# Patient Record
Sex: OTHER/UNKNOWN | URBAN - METROPOLITAN AREA
[De-identification: names, ages, dates, MRNs, and addresses within clinical notes are randomized per-mention and may not be internally consistent; named-entity substitution may affect disease eponyms.]

---

## 2019-12-05 ENCOUNTER — APPOINTMENT (OUTPATIENT)
Age: 59
Setting detail: DERMATOLOGY
End: 2019-12-11

## 2019-12-05 VITALS
HEART RATE: 91 BPM | WEIGHT: 245 LBS | HEIGHT: 71 IN | DIASTOLIC BLOOD PRESSURE: 73 MMHG | SYSTOLIC BLOOD PRESSURE: 122 MMHG

## 2019-12-05 DIAGNOSIS — L40.0 PSORIASIS VULGARIS: ICD-10-CM

## 2019-12-05 PROBLEM — K75.9 INFLAMMATORY LIVER DISEASE, UNSPECIFIED: Status: ACTIVE | Noted: 2019-12-05

## 2019-12-05 PROCEDURE — 99203 OFFICE O/P NEW LOW 30 MIN: CPT

## 2019-12-05 PROCEDURE — OTHER COUNSELING: OTHER

## 2019-12-05 PROCEDURE — OTHER OTEZLA INITIATION: OTHER

## 2019-12-05 PROCEDURE — OTHER TREATMENT REGIMEN: OTHER

## 2019-12-05 PROCEDURE — OTHER MIPS QUALITY: OTHER

## 2019-12-05 PROCEDURE — OTHER PRESCRIPTION: OTHER

## 2019-12-05 PROCEDURE — OTHER MEDICATION COUNSELING: OTHER

## 2019-12-05 RX ORDER — CALCIPOTRIENE AND BETAMETHASONE DIPROPIONATE 50; .5 UG/G; MG/G
AEROSOL, FOAM TOPICAL QD
Qty: 1 | Refills: 4 | Status: ERX | COMMUNITY
Start: 2019-12-05

## 2019-12-05 RX ORDER — TRIAMCINOLONE ACETONIDE 1 MG/G
CREAM TOPICAL
Qty: 1 | Refills: 0 | Status: ERX | COMMUNITY
Start: 2019-12-05

## 2019-12-05 ASSESSMENT — LOCATION SIMPLE DESCRIPTION DERM
LOCATION SIMPLE: LEFT FOREHEAD
LOCATION SIMPLE: LEFT SHOULDER
LOCATION SIMPLE: LEFT POSTERIOR THIGH
LOCATION SIMPLE: RIGHT UPPER BACK
LOCATION SIMPLE: LEFT KNEE
LOCATION SIMPLE: RIGHT CHEEK
LOCATION SIMPLE: LEFT POPLITEAL SKIN
LOCATION SIMPLE: LEFT BUTTOCK
LOCATION SIMPLE: RIGHT BUTTOCK
LOCATION SIMPLE: RIGHT SCALP
LOCATION SIMPLE: LEFT LOWER BACK
LOCATION SIMPLE: LEFT UPPER ARM
LOCATION SIMPLE: UPPER LIP
LOCATION SIMPLE: POSTERIOR NECK
LOCATION SIMPLE: RIGHT SHOULDER
LOCATION SIMPLE: RIGHT UPPER ARM
LOCATION SIMPLE: RIGHT FOREHEAD
LOCATION SIMPLE: RIGHT PRETIBIAL REGION
LOCATION SIMPLE: RIGHT POSTERIOR THIGH
LOCATION SIMPLE: RIGHT CLAVICULAR SKIN
LOCATION SIMPLE: LEFT ELBOW
LOCATION SIMPLE: LEFT CLAVICULAR SKIN
LOCATION SIMPLE: RIGHT ANTERIOR NECK
LOCATION SIMPLE: LEFT THIGH
LOCATION SIMPLE: LEFT LIP
LOCATION SIMPLE: LEFT PRETIBIAL REGION
LOCATION SIMPLE: RIGHT LOWER BACK
LOCATION SIMPLE: LEFT ANTERIOR NECK
LOCATION SIMPLE: ABDOMEN
LOCATION SIMPLE: RIGHT POPLITEAL SKIN
LOCATION SIMPLE: LEFT CALF
LOCATION SIMPLE: SCALP
LOCATION SIMPLE: RIGHT FOREARM
LOCATION SIMPLE: RIGHT ELBOW
LOCATION SIMPLE: RIGHT KNEE
LOCATION SIMPLE: RIGHT CALF
LOCATION SIMPLE: RIGHT THIGH

## 2019-12-05 ASSESSMENT — LOCATION DETAILED DESCRIPTION DERM
LOCATION DETAILED: PHILTRUM
LOCATION DETAILED: RIGHT CLAVICULAR NECK
LOCATION DETAILED: RIGHT SUPERIOR FOREHEAD
LOCATION DETAILED: LEFT LATERAL BUTTOCK
LOCATION DETAILED: PERIUMBILICAL SKIN
LOCATION DETAILED: RIGHT INFERIOR ANTERIOR NECK
LOCATION DETAILED: RIGHT PROXIMAL POSTERIOR THIGH
LOCATION DETAILED: LEFT MEDIAL TRAPEZIAL NECK
LOCATION DETAILED: RIGHT DISTAL CALF
LOCATION DETAILED: LEFT CLAVICULAR NECK
LOCATION DETAILED: RIGHT LATERAL FOREHEAD
LOCATION DETAILED: RIGHT MEDIAL FRONTAL SCALP
LOCATION DETAILED: LEFT PROXIMAL PRETIBIAL REGION
LOCATION DETAILED: LEFT DISTAL MEDIAL CALF
LOCATION DETAILED: LEFT INFERIOR LATERAL FOREHEAD
LOCATION DETAILED: RIGHT INFERIOR CENTRAL MALAR CHEEK
LOCATION DETAILED: LEFT ANTECUBITAL SKIN
LOCATION DETAILED: RIGHT SUPERIOR UPPER BACK
LOCATION DETAILED: LEFT ELBOW
LOCATION DETAILED: LEFT ANTERIOR PROXIMAL UPPER ARM
LOCATION DETAILED: RIGHT SUPERIOR PARIETAL SCALP
LOCATION DETAILED: RIGHT ELBOW
LOCATION DETAILED: RIGHT VENTRAL PROXIMAL FOREARM
LOCATION DETAILED: LEFT CLAVICULAR SKIN
LOCATION DETAILED: RIGHT DISTAL PRETIBIAL REGION
LOCATION DETAILED: LEFT POPLITEAL SKIN
LOCATION DETAILED: RIGHT POPLITEAL SKIN
LOCATION DETAILED: LEFT ANTERIOR SHOULDER
LOCATION DETAILED: LEFT SUPERIOR LATERAL MIDBACK
LOCATION DETAILED: LEFT DISTAL CALF
LOCATION DETAILED: RIGHT POSTERIOR SHOULDER
LOCATION DETAILED: RIGHT BUTTOCK
LOCATION DETAILED: RIGHT ANTERIOR DISTAL UPPER ARM
LOCATION DETAILED: RIGHT ANTERIOR SHOULDER
LOCATION DETAILED: LEFT POSTERIOR SHOULDER
LOCATION DETAILED: LEFT ANTERIOR PROXIMAL THIGH
LOCATION DETAILED: RIGHT KNEE
LOCATION DETAILED: LEFT PROXIMAL POSTERIOR THIGH
LOCATION DETAILED: RIGHT RIB CAGE
LOCATION DETAILED: RIGHT INFERIOR MEDIAL MIDBACK
LOCATION DETAILED: LEFT SUPERIOR FOREHEAD
LOCATION DETAILED: LEFT SUPERIOR PARIETAL SCALP
LOCATION DETAILED: RIGHT ANTERIOR PROXIMAL THIGH
LOCATION DETAILED: LEFT KNEE
LOCATION DETAILED: RIGHT CLAVICULAR SKIN
LOCATION DETAILED: LEFT LOWER CUTANEOUS LIP

## 2019-12-05 ASSESSMENT — LOCATION ZONE DERM
LOCATION ZONE: LEG
LOCATION ZONE: TRUNK
LOCATION ZONE: FACE
LOCATION ZONE: SCALP
LOCATION ZONE: LIP
LOCATION ZONE: ARM
LOCATION ZONE: NECK

## 2019-12-05 ASSESSMENT — BSA PSORIASIS: % BODY COVERED IN PSORIASIS: 70

## 2019-12-05 NOTE — PROCEDURE: MIPS QUALITY
Quality 110: Preventive Care And Screening: Influenza Immunization: Influenza Immunization not Administered because Patient Refused.
Detail Level: Detailed
Quality 226: Preventive Care And Screening: Tobacco Use: Screening And Cessation Intervention: Patient screened for tobacco use, is a smoker AND did not received Cessation Counseling for Medical Reasons

## 2019-12-05 NOTE — PROCEDURE: TREATMENT REGIMEN
Action 4: Continue
Discontinue Regimen: Griseofolvin
Start Regimen: Otezla starter pack-Follow directions as prescribed.
Detail Level: Zone

## 2019-12-09 ENCOUNTER — RX ONLY (RX ONLY)
Age: 59
End: 2019-12-09

## 2019-12-09 RX ORDER — CLOBETASOL PROPIONATE 0.5 MG/ML
PEA SIZE SOLUTION TOPICAL BID
Qty: 1 | Refills: 4 | Status: ERX | COMMUNITY
Start: 2019-12-09

## 2020-01-03 ENCOUNTER — RX ONLY (RX ONLY)
Age: 60
End: 2020-01-03

## 2020-01-03 ENCOUNTER — APPOINTMENT (OUTPATIENT)
Age: 60
Setting detail: DERMATOLOGY
End: 2020-01-08

## 2020-01-03 VITALS
HEIGHT: 71 IN | WEIGHT: 245 LBS | SYSTOLIC BLOOD PRESSURE: 91 MMHG | DIASTOLIC BLOOD PRESSURE: 56 MMHG | HEART RATE: 112 BPM

## 2020-01-03 DIAGNOSIS — L40.0 PSORIASIS VULGARIS: ICD-10-CM

## 2020-01-03 PROCEDURE — OTHER MIPS QUALITY: OTHER

## 2020-01-03 PROCEDURE — OTHER TREATMENT REGIMEN: OTHER

## 2020-01-03 PROCEDURE — OTHER COUNSELING: OTHER

## 2020-01-03 PROCEDURE — 99214 OFFICE O/P EST MOD 30 MIN: CPT

## 2020-01-03 PROCEDURE — OTHER MEDICATION COUNSELING: OTHER

## 2020-01-03 RX ORDER — CLOBETASOL PROPIONATE 0.5 MG/ML
PEA SIZE SOLUTION TOPICAL BID
Qty: 1 | Refills: 5 | Status: ERX | COMMUNITY
Start: 2020-01-03

## 2020-01-03 RX ORDER — TRIAMCINOLONE ACETONIDE 1 MG/G
CREAM TOPICAL
Qty: 1 | Refills: 5 | Status: ERX

## 2020-01-03 RX ORDER — CALCIPOTRIENE AND BETAMETHASONE DIPROPIONATE 50; .5 UG/G; MG/G
AEROSOL, FOAM TOPICAL QD
Qty: 1 | Refills: 4 | Status: ACTIVE

## 2020-01-03 ASSESSMENT — LOCATION DETAILED DESCRIPTION DERM
LOCATION DETAILED: LEFT POPLITEAL SKIN
LOCATION DETAILED: LEFT DISTAL MEDIAL CALF
LOCATION DETAILED: RIGHT SUPERIOR FOREHEAD
LOCATION DETAILED: RIGHT SUPERIOR UPPER BACK
LOCATION DETAILED: RIGHT INFERIOR ANTERIOR NECK
LOCATION DETAILED: LEFT SUPERIOR FOREHEAD
LOCATION DETAILED: LEFT ANTERIOR PROXIMAL THIGH
LOCATION DETAILED: RIGHT ANTERIOR PROXIMAL THIGH
LOCATION DETAILED: RIGHT CLAVICULAR SKIN
LOCATION DETAILED: RIGHT INFERIOR MEDIAL MIDBACK
LOCATION DETAILED: LEFT POSTERIOR SHOULDER
LOCATION DETAILED: RIGHT ANTERIOR SHOULDER
LOCATION DETAILED: PHILTRUM
LOCATION DETAILED: LEFT ANTECUBITAL SKIN
LOCATION DETAILED: LEFT CLAVICULAR SKIN
LOCATION DETAILED: LEFT ELBOW
LOCATION DETAILED: LEFT CLAVICULAR NECK
LOCATION DETAILED: LEFT INFERIOR LATERAL FOREHEAD
LOCATION DETAILED: LEFT LATERAL BUTTOCK
LOCATION DETAILED: LEFT LOWER CUTANEOUS LIP
LOCATION DETAILED: RIGHT SUPERIOR PARIETAL SCALP
LOCATION DETAILED: RIGHT LATERAL FOREHEAD
LOCATION DETAILED: RIGHT POPLITEAL SKIN
LOCATION DETAILED: RIGHT POSTERIOR SHOULDER
LOCATION DETAILED: LEFT MEDIAL TRAPEZIAL NECK
LOCATION DETAILED: RIGHT VENTRAL PROXIMAL FOREARM
LOCATION DETAILED: LEFT ANTERIOR SHOULDER
LOCATION DETAILED: RIGHT ELBOW
LOCATION DETAILED: LEFT SUPERIOR LATERAL MIDBACK
LOCATION DETAILED: PERIUMBILICAL SKIN
LOCATION DETAILED: RIGHT INFERIOR CENTRAL MALAR CHEEK
LOCATION DETAILED: RIGHT CLAVICULAR NECK
LOCATION DETAILED: LEFT KNEE
LOCATION DETAILED: RIGHT KNEE
LOCATION DETAILED: LEFT ANTERIOR PROXIMAL UPPER ARM
LOCATION DETAILED: LEFT SUPERIOR PARIETAL SCALP
LOCATION DETAILED: RIGHT ANTERIOR DISTAL UPPER ARM
LOCATION DETAILED: RIGHT BUTTOCK
LOCATION DETAILED: LEFT PROXIMAL POSTERIOR THIGH
LOCATION DETAILED: RIGHT DISTAL CALF
LOCATION DETAILED: RIGHT DISTAL PRETIBIAL REGION
LOCATION DETAILED: RIGHT PROXIMAL POSTERIOR THIGH
LOCATION DETAILED: RIGHT MEDIAL FRONTAL SCALP
LOCATION DETAILED: RIGHT RIB CAGE
LOCATION DETAILED: LEFT PROXIMAL PRETIBIAL REGION

## 2020-01-03 ASSESSMENT — LOCATION SIMPLE DESCRIPTION DERM
LOCATION SIMPLE: LEFT UPPER ARM
LOCATION SIMPLE: RIGHT PRETIBIAL REGION
LOCATION SIMPLE: RIGHT UPPER BACK
LOCATION SIMPLE: RIGHT THIGH
LOCATION SIMPLE: RIGHT CALF
LOCATION SIMPLE: RIGHT KNEE
LOCATION SIMPLE: UPPER LIP
LOCATION SIMPLE: RIGHT POSTERIOR THIGH
LOCATION SIMPLE: RIGHT FOREHEAD
LOCATION SIMPLE: RIGHT CLAVICULAR SKIN
LOCATION SIMPLE: LEFT FOREHEAD
LOCATION SIMPLE: RIGHT POPLITEAL SKIN
LOCATION SIMPLE: LEFT SHOULDER
LOCATION SIMPLE: RIGHT SCALP
LOCATION SIMPLE: RIGHT LOWER BACK
LOCATION SIMPLE: RIGHT FOREARM
LOCATION SIMPLE: LEFT POPLITEAL SKIN
LOCATION SIMPLE: LEFT PRETIBIAL REGION
LOCATION SIMPLE: LEFT THIGH
LOCATION SIMPLE: RIGHT SHOULDER
LOCATION SIMPLE: LEFT CALF
LOCATION SIMPLE: RIGHT CHEEK
LOCATION SIMPLE: LEFT LOWER BACK
LOCATION SIMPLE: POSTERIOR NECK
LOCATION SIMPLE: ABDOMEN
LOCATION SIMPLE: LEFT KNEE
LOCATION SIMPLE: LEFT POSTERIOR THIGH
LOCATION SIMPLE: RIGHT UPPER ARM
LOCATION SIMPLE: LEFT LIP
LOCATION SIMPLE: RIGHT ELBOW
LOCATION SIMPLE: SCALP
LOCATION SIMPLE: RIGHT ANTERIOR NECK
LOCATION SIMPLE: RIGHT BUTTOCK
LOCATION SIMPLE: LEFT ANTERIOR NECK
LOCATION SIMPLE: LEFT ELBOW
LOCATION SIMPLE: LEFT CLAVICULAR SKIN
LOCATION SIMPLE: LEFT BUTTOCK

## 2020-01-03 ASSESSMENT — LOCATION ZONE DERM
LOCATION ZONE: NECK
LOCATION ZONE: LIP
LOCATION ZONE: LEG
LOCATION ZONE: FACE
LOCATION ZONE: TRUNK
LOCATION ZONE: SCALP
LOCATION ZONE: ARM

## 2020-01-03 NOTE — PROCEDURE: TREATMENT REGIMEN
Action 1: Continue
Continue Regimen: triamcinolone acetonide 0.1 % topical cream TP Frequency: Psoriasis Sig: Apply affected areas twice daily x2 weeks, then once daily x1 weeks\\nEnstilar 0.005 %-0.064 % topical foam TP Frequency: Qd Sig: Apply to affected areas on scalp once daily at bedtime for up to 8weeks. Avoid underarms, groin, and face. (4 refills)
Detail Level: Zone

## 2020-02-07 ENCOUNTER — APPOINTMENT (OUTPATIENT)
Age: 60
Setting detail: DERMATOLOGY
End: 2020-02-07

## 2020-02-07 VITALS — HEIGHT: 71 IN | WEIGHT: 245 LBS

## 2020-02-07 DIAGNOSIS — L40.0 PSORIASIS VULGARIS: ICD-10-CM

## 2020-02-07 PROCEDURE — OTHER COUNSELING: OTHER

## 2020-02-07 PROCEDURE — OTHER MEDICATION COUNSELING: OTHER

## 2020-02-07 PROCEDURE — OTHER PRESCRIPTION: OTHER

## 2020-02-07 PROCEDURE — OTHER TREATMENT REGIMEN: OTHER

## 2020-02-07 PROCEDURE — OTHER ADDITIONAL NOTES: OTHER

## 2020-02-07 PROCEDURE — OTHER MIPS QUALITY: OTHER

## 2020-02-07 PROCEDURE — 99214 OFFICE O/P EST MOD 30 MIN: CPT

## 2020-02-07 RX ORDER — TRIAMCINOLONE ACETONIDE 1 MG/G
PEA SIZE CREAM TOPICAL BID
Qty: 1 | Refills: 4 | Status: ERX

## 2020-02-07 ASSESSMENT — LOCATION SIMPLE DESCRIPTION DERM
LOCATION SIMPLE: RIGHT THIGH
LOCATION SIMPLE: LEFT SHOULDER
LOCATION SIMPLE: LEFT KNEE
LOCATION SIMPLE: RIGHT KNEE
LOCATION SIMPLE: LEFT UPPER ARM
LOCATION SIMPLE: RIGHT SHOULDER
LOCATION SIMPLE: RIGHT CALF
LOCATION SIMPLE: LEFT ELBOW
LOCATION SIMPLE: RIGHT LOWER BACK
LOCATION SIMPLE: RIGHT PRETIBIAL REGION
LOCATION SIMPLE: LEFT THIGH
LOCATION SIMPLE: LEFT LOWER BACK
LOCATION SIMPLE: ABDOMEN
LOCATION SIMPLE: LEFT PRETIBIAL REGION
LOCATION SIMPLE: RIGHT ELBOW
LOCATION SIMPLE: LEFT CALF
LOCATION SIMPLE: LEFT FOREARM

## 2020-02-07 ASSESSMENT — LOCATION ZONE DERM
LOCATION ZONE: ARM
LOCATION ZONE: TRUNK
LOCATION ZONE: LEG

## 2020-02-07 ASSESSMENT — LOCATION DETAILED DESCRIPTION DERM
LOCATION DETAILED: RIGHT ANTERIOR SHOULDER
LOCATION DETAILED: LEFT PROXIMAL PRETIBIAL REGION
LOCATION DETAILED: RIGHT INFERIOR MEDIAL MIDBACK
LOCATION DETAILED: RIGHT ANTERIOR PROXIMAL THIGH
LOCATION DETAILED: LEFT ANTERIOR PROXIMAL THIGH
LOCATION DETAILED: LEFT ELBOW
LOCATION DETAILED: RIGHT DISTAL CALF
LOCATION DETAILED: LEFT ANTERIOR SHOULDER
LOCATION DETAILED: LEFT KNEE
LOCATION DETAILED: LEFT SUPERIOR LATERAL MIDBACK
LOCATION DETAILED: LEFT VENTRAL PROXIMAL FOREARM
LOCATION DETAILED: PERIUMBILICAL SKIN
LOCATION DETAILED: LEFT ANTERIOR DISTAL UPPER ARM
LOCATION DETAILED: RIGHT KNEE
LOCATION DETAILED: LEFT DISTAL MEDIAL CALF
LOCATION DETAILED: RIGHT DISTAL PRETIBIAL REGION
LOCATION DETAILED: RIGHT ELBOW
LOCATION DETAILED: LEFT ANTECUBITAL SKIN

## 2020-02-07 NOTE — PROCEDURE: ADDITIONAL NOTES
Detail Level: Simple
Additional Notes: Continue use of Otezla BID, hydroxzine as needed at bedtime for itching.

## 2020-02-07 NOTE — PROCEDURE: TREATMENT REGIMEN
Action 1: Continue
Detail Level: Zone
Continue Regimen: triamcinolone acetonide 0.1 % topical cream TP Frequency: Psoriasis Sig: Apply affected areas twice daily x2 weeks, then once daily x1 weeks\\nEnstilar 0.005 %-0.064 % topical foam TP Frequency: Qd Sig: Apply to affected areas on scalp once daily at bedtime for up to 8weeks. Avoid underarms, groin, and face. (4 refills)

## 2023-08-05 NOTE — PROCEDURE: MEDICATION COUNSELING
02-Aug-2023 11:39 02-Aug-2023 13:13 03-Aug-2023 11:42 04-Aug-2023 17:07 22-Jul-2023 15:19 23-Jul-2023 09:00 20-Jul-2023 15:26 02-Aug-2023 21:52 05-Aug-2023 07:42 31-Jul-2023 14:57 Opioid Pregnancy And Lactation Text: These medications can lead to premature delivery and should be avoided during pregnancy. These medications are also present in breast milk in small amounts.

## 2024-03-24 NOTE — PROCEDURE: MEDICATION COUNSELING
Bexarotene Counseling:  I discussed with the patient the risks of bexarotene including but not limited to hair loss, dry lips/skin/eyes, liver abnormalities, hyperlipidemia, pancreatitis, depression/suicidal ideation, photosensitivity, drug rash/allergic reactions, hypothyroidism, anemia, leukopenia, infection, cataracts, and teratogenicity.  Patient understands that they will need regular blood tests to check lipid profile, liver function tests, white blood cell count, thyroid function tests and pregnancy test if applicable. Stable